# Patient Record
Sex: MALE | Race: WHITE | NOT HISPANIC OR LATINO | Employment: OTHER | URBAN - METROPOLITAN AREA
[De-identification: names, ages, dates, MRNs, and addresses within clinical notes are randomized per-mention and may not be internally consistent; named-entity substitution may affect disease eponyms.]

---

## 2024-10-28 PROBLEM — I10 HYPERTENSION: Status: ACTIVE | Noted: 2024-10-28

## 2024-10-28 PROBLEM — E78.00 HYPERCHOLESTEROLEMIA: Status: ACTIVE | Noted: 2024-10-28

## 2024-10-28 NOTE — PROGRESS NOTES
Subjective   Patient ID:   Dusty Rodas is a 74 y.o. male who presents for No chief complaint on file..  HPI  New patient here today to establish care with myself.  Last PCP:  Last seen:  Not currently on medications.    HTN:  Not on medication for this.  BP today is  Denies dizziness, headaches.    HLD:  Not on medication for this.  DUE for labs.    Health maintenance:  Smoking:  PSA (50+): DUE  Labs: DUE  Colonoscopy (50-75): DUE  Prevnar 13 (65+): DUE  Pneumovax 23 (65+, 1 year after Prev 13):  Influenza:  Shingrix (2 doses, 2-6 months apart):    Review of Systems  12 point review of systems negative unless stated above in HPI    There were no vitals filed for this visit.    Physical Exam  General: Alert and oriented, well nourished, no acute distress.  Lungs: Clear to auscultation, non-labored respiration.  Heart: Normal rate, regular rhythm, no murmur, gallop or edema.  Neurologic: Awake, alert, and oriented X3, CN II-XII intact.  Psychiatric: Cooperative, appropriate mood and affect.    Assessment/Plan   Diagnoses and all orders for this visit:  Primary hypertension  Hypercholesterolemia  Screening PSA (prostate specific antigen)

## 2024-11-06 ENCOUNTER — OFFICE VISIT (OUTPATIENT)
Dept: PRIMARY CARE | Facility: CLINIC | Age: 74
End: 2024-11-06
Payer: MEDICARE

## 2024-11-06 VITALS
BODY MASS INDEX: 26.68 KG/M2 | SYSTOLIC BLOOD PRESSURE: 160 MMHG | HEART RATE: 56 BPM | DIASTOLIC BLOOD PRESSURE: 74 MMHG | OXYGEN SATURATION: 97 % | WEIGHT: 190.6 LBS | HEIGHT: 71 IN

## 2024-11-06 DIAGNOSIS — Z13.31 DEPRESSION SCREENING: ICD-10-CM

## 2024-11-06 DIAGNOSIS — Z00.00 ROUTINE GENERAL MEDICAL EXAMINATION AT HEALTH CARE FACILITY: ICD-10-CM

## 2024-11-06 DIAGNOSIS — Z00.00 MEDICARE ANNUAL WELLNESS VISIT, SUBSEQUENT: Primary | ICD-10-CM

## 2024-11-06 DIAGNOSIS — Z12.5 SCREENING PSA (PROSTATE SPECIFIC ANTIGEN): ICD-10-CM

## 2024-11-06 DIAGNOSIS — I10 PRIMARY HYPERTENSION: ICD-10-CM

## 2024-11-06 DIAGNOSIS — E78.00 HYPERCHOLESTEROLEMIA: ICD-10-CM

## 2024-11-06 PROCEDURE — 99215 OFFICE O/P EST HI 40 MIN: CPT | Performed by: PHYSICIAN ASSISTANT

## 2024-11-06 PROCEDURE — 99213 OFFICE O/P EST LOW 20 MIN: CPT | Mod: 25 | Performed by: PHYSICIAN ASSISTANT

## 2024-11-06 PROCEDURE — 99387 INIT PM E/M NEW PAT 65+ YRS: CPT | Performed by: PHYSICIAN ASSISTANT

## 2024-11-06 ASSESSMENT — ENCOUNTER SYMPTOMS
DEPRESSION: 0
OCCASIONAL FEELINGS OF UNSTEADINESS: 0
LOSS OF SENSATION IN FEET: 0

## 2024-11-06 ASSESSMENT — ACTIVITIES OF DAILY LIVING (ADL)
DOING_HOUSEWORK: INDEPENDENT
MANAGING_FINANCES: INDEPENDENT
TAKING_MEDICATION: INDEPENDENT
DRESSING: INDEPENDENT
BATHING: INDEPENDENT
GROCERY_SHOPPING: INDEPENDENT

## 2024-11-06 ASSESSMENT — PATIENT HEALTH QUESTIONNAIRE - PHQ9
2. FEELING DOWN, DEPRESSED OR HOPELESS: NOT AT ALL
SUM OF ALL RESPONSES TO PHQ9 QUESTIONS 1 AND 2: 0
1. LITTLE INTEREST OR PLEASURE IN DOING THINGS: NOT AT ALL

## 2024-11-06 ASSESSMENT — PAIN SCALES - GENERAL: PAINLEVEL_OUTOF10: 0-NO PAIN

## 2024-11-06 NOTE — ASSESSMENT & PLAN NOTE
Orders:    Comprehensive Metabolic Panel; Future    Lipid Panel; Future    CBC and Auto Differential; Future

## 2024-11-06 NOTE — PROGRESS NOTES
"Subjective   Reason for Visit: Dusty Rodas is an 74 y.o. male here for a Medicare Wellness visit.     Past Medical, Surgical, and Family History reviewed and updated in chart.    Reviewed all medications by prescribing practitioner or clinical pharmacist (such as prescriptions, OTCs, herbal therapies and supplements) and documented in the medical record.    [facility]  Patient Care Team:  Hiren Lou PA-C as PCP - General (Internal Medicine)  Boaz Gunn MD as PCP - [employer] PCP     New patient here today to establish care with myself.  Last PCP: N/A  Last seen:  Not currently on medications.  Prefers the natural route for treatment - vitamins.    HTN:  Not on medication for this.  BP today is 160/74.  Denies dizziness, headaches.    HLD:  Not on medication for this.  DUE for labs.    Health maintenance:  Smoking: Never a smoker.  PSA (50+): DUE  Labs: DUE  Colonoscopy (50-75): DUE - declined  Prevnar 13 (65+): DUE  Pneumovax 23 (65+, 1 year after Prev 13):  Influenza: Declined  Shingrix (2 doses, 2-6 months apart):    Review of Systems  12 point review of systems negative unless stated above in HPI    Objective   Vitals:  /74   Pulse 56   Ht 1.791 m (5' 10.5\")   Wt 86.5 kg (190 lb 9.6 oz)   SpO2 97%   BMI 26.96 kg/m²       Physical Exam  General: Alert and oriented, well nourished, no acute distress.  Lungs: Clear to auscultation, non-labored respiration.  Heart: Normal rate, regular rhythm, no murmur, gallop or edema.  Neurologic: Awake, alert, and oriented X3, CN II-XII intact.  Psychiatric: Cooperative, appropriate mood and affect.  Assessment & Plan  Medicare annual wellness visit, subsequent  It was nice meeting you today!  This counts as your annual Medicare Wellness visit.  Health maintenance was reviewed today.  Depression screening is negative (5 -15 min screening was completed).  I have ordered some labs to be done as soon as you can.  We will call you with the " results.  BP is high today.  I do think medication would be helpful, but patient prefers not to be on medication.  Consider monitoring this at home.  Call with questions or concerns.    Fu 6 months       Primary hypertension    Orders:    Comprehensive Metabolic Panel; Future    Lipid Panel; Future    CBC and Auto Differential; Future    Hypercholesterolemia         Screening PSA (prostate specific antigen)    Orders:    Prostate Specific Antigen, Screen; Future    Depression screening         Routine general medical examination at health care facility    Orders:    1 Year Follow Up In Primary Care - Wellness Exam; Future